# Patient Record
Sex: FEMALE | Race: WHITE | NOT HISPANIC OR LATINO | Employment: FULL TIME | URBAN - METROPOLITAN AREA
[De-identification: names, ages, dates, MRNs, and addresses within clinical notes are randomized per-mention and may not be internally consistent; named-entity substitution may affect disease eponyms.]

---

## 2017-05-01 ENCOUNTER — ALLSCRIPTS OFFICE VISIT (OUTPATIENT)
Dept: OTHER | Facility: OTHER | Age: 32
End: 2017-05-01

## 2018-01-13 VITALS
DIASTOLIC BLOOD PRESSURE: 90 MMHG | HEIGHT: 62 IN | BODY MASS INDEX: 41.22 KG/M2 | SYSTOLIC BLOOD PRESSURE: 130 MMHG | WEIGHT: 224 LBS

## 2018-08-01 ENCOUNTER — ANNUAL EXAM (OUTPATIENT)
Dept: OBGYN CLINIC | Facility: CLINIC | Age: 33
End: 2018-08-01
Payer: COMMERCIAL

## 2018-08-01 VITALS
DIASTOLIC BLOOD PRESSURE: 100 MMHG | HEIGHT: 62 IN | WEIGHT: 237 LBS | BODY MASS INDEX: 43.61 KG/M2 | SYSTOLIC BLOOD PRESSURE: 158 MMHG

## 2018-08-01 DIAGNOSIS — Z01.419 ENCOUNTER FOR ANNUAL ROUTINE GYNECOLOGICAL EXAMINATION: Primary | ICD-10-CM

## 2018-08-01 DIAGNOSIS — E66.01 MORBID OBESITY (HCC): ICD-10-CM

## 2018-08-01 DIAGNOSIS — E58 DIETARY CALCIUM DEFICIENCY: ICD-10-CM

## 2018-08-01 DIAGNOSIS — Z72.3 INADEQUATE EXERCISE: ICD-10-CM

## 2018-08-01 PROCEDURE — S0612 ANNUAL GYNECOLOGICAL EXAMINA: HCPCS | Performed by: OBSTETRICS & GYNECOLOGY

## 2018-08-01 RX ORDER — TRIAMCINOLONE ACETONIDE 1 MG/G
CREAM TOPICAL
Refills: 0 | COMMUNITY
Start: 2018-06-18

## 2018-08-01 RX ORDER — EPINEPHRINE 0.3 MG/.3ML
INJECTION INTRAMUSCULAR
Refills: 0 | COMMUNITY
Start: 2018-06-07

## 2018-08-01 RX ORDER — LABETALOL 200 MG/1
200 TABLET, FILM COATED ORAL
COMMUNITY

## 2018-08-01 NOTE — PROGRESS NOTES
Pt is a 27 y o  Nathan Knowlesns with Patient's last menstrual period was 2018 (exact date)  using open to pregnancy for [facility] presents for preventive care  She notes the same partner since her last STI evaluation  In her lifetime she has been involved with 4   Safe sexual practices (monogomy, condoms) are followed consistently  · She does  feel safe in the relationship  She does feel safe in her home  · Her calcium intake encompasses prenatal vitamin, milk (cow, goat, almond, cashew, soy, etc), cheese and yogurt for a total of 4-5 servings daily on average  She does take additional Vitamin D (MVI or supplement)  · She exercises 2-3 times per week  · Her menses occur every 29 Days, last 4-5 days and require regular pad every 5-6 hours  Menstrual History:  OB History      Para Term  AB Living    0 0 0 0 0 0    SAB TAB Ectopic Multiple Live Births    0 0 0 0 0        Obstetric Comments    Menarche: 10    29/4-5/regular pad every 6 hours         Menarche age: 13  Patient's last menstrual period was 2018 (exact date)  ·      · She has completed the HPV vaccine series appropriate for age    · tobacco use : does not use tobacco              · Last pap: 2016--wnl, repeat 2019    Past Medical History:   Diagnosis Date    Abnormal Pap smear of cervix     2013 per pt 2016-wnl, HPV neg    Chicken pox     Hypertension     Oral herpes        Past Surgical History:   Procedure Laterality Date    ANKLE SURGERY Right     ligament repair    FOREARM FRACTURE SURGERY Right     age 4    KNEE SURGERY Right     crush injury       OB History    Para Term  AB Living   0 0 0 0 0 0   SAB TAB Ectopic Multiple Live Births   0 0 0 0 0         Obstetric Comments   Menarche: 10      /-5/regular pad every 6 hours       Gyn HX:  abnormal pap       Current Outpatient Prescriptions:     EPIPEN 2-ABEL 0 3 MG/0 3ML SOAJ, Use as directed, Disp: , Rfl: 0    labetalol (NORMODYNE) 200 mg tablet, Take 200 mg by mouth, Disp: , Rfl:     PROAIR  (90 Base) MCG/ACT inhaler, INHALE 1 TO 2 PUFFS BY MOUTH EVERY 4 TO 6 HOURS AS NEEDED, Disp: , Rfl: 3    triamcinolone (KENALOG) 0 1 % cream, APPLY THIN COAT TO AFFECTED AREA TWICE A DAY IN THE MORNING AND IN THE EVENING, Disp: , Rfl: 0    No Known Allergies    Social History     Social History    Marital status: /Civil Union     Spouse name: Ton English Number of children: 0    Years of education: 12     Occupational History    Dispathcer      Social History Main Topics    Smoking status: Never Smoker    Smokeless tobacco: Never Used    Alcohol use 0 6 oz/week     1 Glasses of wine per week    Drug use: No    Sexual activity: Yes     Partners: Male     Birth control/ protection: None      Comment: lifetime partners: 4; current partner: >5 years     Other Topics Concern    None     Social History Narrative    Synagogue: No preference    Accepts blood products            Calcium: 1 c almond daily, 1 yogurt daily, 1 prenatal vitamin daily, 1 cheese daily       Family History   Problem Relation Age of Onset    No Known Problems Mother     Hypertension Father     No Known Problems Sister     Skin cancer Maternal Grandmother     Diabetes Maternal Grandfather     Hypertension Maternal Grandfather     Stroke Maternal Grandfather     Diabetes Paternal Grandmother     Dementia Paternal Grandfather        Blood pressure 158/100, height 5' 2 21" (1 58 m), weight 108 kg (237 lb), last menstrual period 08/01/2018, not currently breastfeeding  and Body mass index is 43 06 kg/m²  Physical Exam   Constitutional: She is oriented to person, place, and time  She appears well-developed and well-nourished  HENT:   Head: Normocephalic and atraumatic  Eyes: Conjunctivae and EOM are normal    Neck: Normal range of motion  Neck supple  No tracheal deviation present  No thyromegaly present     Cardiovascular: Normal rate, regular rhythm and normal heart sounds  Pulmonary/Chest: Effort normal and breath sounds normal  No stridor  No respiratory distress  She has no wheezes  She has no rales  Abdominal: Soft  Bowel sounds are normal  She exhibits no distension and no mass  There is no tenderness  There is no rebound and no guarding  Musculoskeletal: Normal range of motion  She exhibits no edema or tenderness  Lymphadenopathy:     She has no cervical adenopathy  Neurological: She is alert and oriented to person, place, and time  Skin: Skin is warm  No rash noted  No erythema  Psychiatric: She has a normal mood and affect  Her behavior is normal  Judgment and thought content normal      Breasts: breasts appear normal, no suspicious masses, no skin or nipple changes or axillary nodes  vulva: normal external genitalia for age and no lesions, masses, epithelial changes, or exudate  vagina: color pink, rugae  well formed rugae and bleeding  with a small amount of bleeding  cervix: nullip and no lesions   uterus: NSSC, AF, NT, mobile  adnexa: no masses or tenderness      A/P:  Pt is a 27 y o  [facility] with      Diagnoses and all orders for this visit:    Encounter for annual routine gynecological examination    Dietary calcium deficiency    Inadequate exercise    Morbid obesity ([address] )      Patient advised recommendation of daily dietary calcium of 1000 1000 mg calcium  Patient advised recommendation of exercise 5 times per week for 30 minutes  Patient advised recommendation of BMI to be between 19-25  Advised pt to call if not pregnant within 6 months of her 33th birthday as she has started to attempt pregnancy  Advised timed intercourse and ovulation detection kits  Advised good bp control and weight loss prior to pregnancy  Pt reports pcp following bp as it has been higher lately

## 2018-09-17 ENCOUNTER — CLINICAL SUPPORT (OUTPATIENT)
Dept: OBGYN CLINIC | Facility: CLINIC | Age: 33
End: 2018-09-17
Payer: COMMERCIAL

## 2018-09-17 VITALS — BODY MASS INDEX: 43.43 KG/M2 | WEIGHT: 239 LBS

## 2018-09-17 DIAGNOSIS — O20.0 THREATENED ABORTION: ICD-10-CM

## 2018-09-17 DIAGNOSIS — N94.89 SUPPRESSION OF MENSTRUATION: Primary | ICD-10-CM

## 2018-09-17 LAB — SL AMB POCT URINE HCG: NEGATIVE

## 2018-09-17 PROCEDURE — 81025 URINE PREGNANCY TEST: CPT | Performed by: OBSTETRICS & GYNECOLOGY

## 2018-09-17 NOTE — PROGRESS NOTES
Pt present for walk in hcg test  Pt reports taking 4 home pregnancy tests  1st box of two were both positive, second box were both inconclusive  HCG test today in office was negative  Pt to go for hcg quant as well as type and screen per SB  Orders put in and [facility] out [facility]  Pt LMP 08/01/2018 Pt also states she started bleeding early this morning with some cramping

## 2018-09-18 LAB
ABO GROUP BLD: NORMAL
B-HCG SERPL-ACNC: 4 MIU/ML
RH BLD: NORMAL